# Patient Record
Sex: FEMALE | Race: WHITE
[De-identification: names, ages, dates, MRNs, and addresses within clinical notes are randomized per-mention and may not be internally consistent; named-entity substitution may affect disease eponyms.]

---

## 2018-11-18 ENCOUNTER — HOSPITAL ENCOUNTER (EMERGENCY)
Dept: HOSPITAL 92 - ERS | Age: 63
Discharge: NURSING, REHAB OR LONG-TERM CARE | End: 2018-11-18
Payer: COMMERCIAL

## 2018-11-18 DIAGNOSIS — G40.909: ICD-10-CM

## 2018-11-18 DIAGNOSIS — I10: ICD-10-CM
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
## 2018-11-18 DIAGNOSIS — Z79.899: ICD-10-CM

## 2018-11-18 DIAGNOSIS — F31.9: ICD-10-CM

## 2018-11-18 DIAGNOSIS — Z79.01: ICD-10-CM

## 2018-11-18 DIAGNOSIS — S01.01XA: Primary | ICD-10-CM

## 2018-11-18 PROCEDURE — 90471 IMMUNIZATION ADMIN: CPT

## 2018-11-18 PROCEDURE — 12001 RPR S/N/AX/GEN/TRNK 2.5CM/<: CPT

## 2018-11-18 PROCEDURE — 70450 CT HEAD/BRAIN W/O DYE: CPT

## 2018-11-18 PROCEDURE — 90715 TDAP VACCINE 7 YRS/> IM: CPT

## 2018-11-18 NOTE — CT
HEAD CT WITHOUT CONTRAST:

 

11/18/2018

 

HISTORY:

Fall.  Trauma.  Pain.

 

COMPARISON:

01/07/2017

 

TECHNIQUE:

Serial axial CT imaging obtained at 5 mm intervals, from the vertex through the skull base, without c
ontrast.

 

FINDINGS: 

The imaged paranasal sinuses/mastoid air cells are well aerated.

 

There is mild/moderate cerebral volume loss with associated prominence of the CSF containing spaces, 
a stable finding.  There is no intracranial hemorrhage, midline shift, or mass effect.  No displaced 
calvarial fracture.

 

IMPRESSION:

No intracranial hemorrhage or displaced calvarial fracture.

 

POS: [facility]

## 2019-11-15 ENCOUNTER — HOSPITAL ENCOUNTER (INPATIENT)
Dept: HOSPITAL 92 - ERS | Age: 64
LOS: 6 days | Discharge: NURSING, REHAB OR LONG-TERM CARE | DRG: 605 | End: 2019-11-21
Attending: INTERNAL MEDICINE | Admitting: INTERNAL MEDICINE
Payer: COMMERCIAL

## 2019-11-15 VITALS — BODY MASS INDEX: 22.3 KG/M2

## 2019-11-15 DIAGNOSIS — Z86.711: ICD-10-CM

## 2019-11-15 DIAGNOSIS — N39.0: ICD-10-CM

## 2019-11-15 DIAGNOSIS — Z79.899: ICD-10-CM

## 2019-11-15 DIAGNOSIS — Z86.718: ICD-10-CM

## 2019-11-15 DIAGNOSIS — F03.90: ICD-10-CM

## 2019-11-15 DIAGNOSIS — G40.909: ICD-10-CM
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
## 2019-11-15 DIAGNOSIS — Z88.5: ICD-10-CM

## 2019-11-15 DIAGNOSIS — Z66: ICD-10-CM

## 2019-11-15 DIAGNOSIS — F31.9: ICD-10-CM

## 2019-11-15 DIAGNOSIS — S40.021A: Primary | ICD-10-CM

## 2019-11-15 DIAGNOSIS — Z79.82: ICD-10-CM

## 2019-11-15 LAB
ALBUMIN SERPL BCG-MCNC: 3.3 G/DL (ref 3.4–4.8)
ALP SERPL-CCNC: 147 U/L (ref 40–110)
ALT SERPL W P-5'-P-CCNC: 15 U/L (ref 8–55)
ANION GAP SERPL CALC-SCNC: 11 MMOL/L (ref 10–20)
AST SERPL-CCNC: 21 U/L (ref 5–34)
BACTERIA UR QL AUTO: (no result) HPF
BASOPHILS # BLD AUTO: 0 THOU/UL (ref 0–0.2)
BASOPHILS NFR BLD AUTO: 0.3 % (ref 0–1)
BILIRUB SERPL-MCNC: 0.3 MG/DL (ref 0.2–1.2)
BUN SERPL-MCNC: 12 MG/DL (ref 9.8–20.1)
CALCIUM SERPL-MCNC: 8.6 MG/DL (ref 7.8–10.44)
CHLORIDE SERPL-SCNC: 106 MMOL/L (ref 98–107)
CO2 SERPL-SCNC: 25 MMOL/L (ref 23–31)
CREAT CL PREDICTED SERPL C-G-VRATE: 0 ML/MIN (ref 70–130)
EOSINOPHIL # BLD AUTO: 0 THOU/UL (ref 0–0.7)
EOSINOPHIL NFR BLD AUTO: 0.1 % (ref 0–10)
GLOBULIN SER CALC-MCNC: 3.1 G/DL (ref 2.4–3.5)
GLUCOSE SERPL-MCNC: 97 MG/DL (ref 80–115)
HGB BLD-MCNC: 13.5 G/DL (ref 12–16)
LEUKOCYTE ESTERASE UR QL STRIP.AUTO: 25 LEU/UL
LYMPHOCYTES # BLD: 1.8 THOU/UL (ref 1.2–3.4)
LYMPHOCYTES NFR BLD AUTO: 17.4 % (ref 21–51)
MCH RBC QN AUTO: 35.3 PG (ref 27–31)
MCV RBC AUTO: 103 FL (ref 78–98)
MONOCYTES # BLD AUTO: 1 THOU/UL (ref 0.11–0.59)
MONOCYTES NFR BLD AUTO: 9.4 % (ref 0–10)
NEUTROPHILS # BLD AUTO: 7.6 THOU/UL (ref 1.4–6.5)
NEUTROPHILS NFR BLD AUTO: 72.8 % (ref 42–75)
PLATELET # BLD AUTO: 236 THOU/UL (ref 130–400)
POTASSIUM SERPL-SCNC: 3.8 MMOL/L (ref 3.5–5.1)
PROT UR STRIP.AUTO-MCNC: 20 MG/DL
RBC # BLD AUTO: 3.84 MILL/UL (ref 4.2–5.4)
SODIUM SERPL-SCNC: 138 MMOL/L (ref 136–145)
WBC # BLD AUTO: 10.4 THOU/UL (ref 4.8–10.8)

## 2019-11-15 PROCEDURE — 81015 MICROSCOPIC EXAM OF URINE: CPT

## 2019-11-15 PROCEDURE — 80177 DRUG SCRN QUAN LEVETIRACETAM: CPT

## 2019-11-15 PROCEDURE — 83735 ASSAY OF MAGNESIUM: CPT

## 2019-11-15 PROCEDURE — 87149 DNA/RNA DIRECT PROBE: CPT

## 2019-11-15 PROCEDURE — 36415 COLL VENOUS BLD VENIPUNCTURE: CPT

## 2019-11-15 PROCEDURE — 87449 NOS EACH ORGANISM AG IA: CPT

## 2019-11-15 PROCEDURE — 87804 INFLUENZA ASSAY W/OPTIC: CPT

## 2019-11-15 PROCEDURE — 87040 BLOOD CULTURE FOR BACTERIA: CPT

## 2019-11-15 PROCEDURE — 80053 COMPREHEN METABOLIC PANEL: CPT

## 2019-11-15 PROCEDURE — 80048 BASIC METABOLIC PNL TOTAL CA: CPT

## 2019-11-15 PROCEDURE — 87086 URINE CULTURE/COLONY COUNT: CPT

## 2019-11-15 PROCEDURE — 36416 COLLJ CAPILLARY BLOOD SPEC: CPT

## 2019-11-15 PROCEDURE — 51701 INSERT BLADDER CATHETER: CPT

## 2019-11-15 PROCEDURE — 96365 THER/PROPH/DIAG IV INF INIT: CPT

## 2019-11-15 PROCEDURE — 96360 HYDRATION IV INFUSION INIT: CPT

## 2019-11-15 PROCEDURE — 80202 ASSAY OF VANCOMYCIN: CPT

## 2019-11-15 PROCEDURE — 87427 SHIGA-LIKE TOXIN AG IA: CPT

## 2019-11-15 PROCEDURE — 71045 X-RAY EXAM CHEST 1 VIEW: CPT

## 2019-11-15 PROCEDURE — 87324 CLOSTRIDIUM AG IA: CPT

## 2019-11-15 PROCEDURE — 81003 URINALYSIS AUTO W/O SCOPE: CPT

## 2019-11-15 PROCEDURE — 96366 THER/PROPH/DIAG IV INF ADDON: CPT

## 2019-11-15 PROCEDURE — 83605 ASSAY OF LACTIC ACID: CPT

## 2019-11-15 PROCEDURE — 80185 ASSAY OF PHENYTOIN TOTAL: CPT

## 2019-11-15 PROCEDURE — 87045 FECES CULTURE AEROBIC BACT: CPT

## 2019-11-15 PROCEDURE — 96367 TX/PROPH/DG ADDL SEQ IV INF: CPT

## 2019-11-15 PROCEDURE — 87046 STOOL CULTR AEROBIC BACT EA: CPT

## 2019-11-15 PROCEDURE — A4353 INTERMITTENT URINARY CATH: HCPCS

## 2019-11-15 PROCEDURE — 85025 COMPLETE CBC W/AUTO DIFF WBC: CPT

## 2019-11-15 NOTE — ULT
RIGHT UPPER EXTREMITY VENOUS ULTRASOUND:



CLINICAL HISTORY:

Swelling.



FINDINGS: 

There is prominent soft tissue heterogeneity and edema of the right upper extremity which may be on t
he basis of prominent sized hematoma.



No evidence of deep vein thrombosis is seen, although evaluation is limited due to extensive edema an
d soft tissue hematoma.



IMPRESSION: 

1.  Prominent, heterogeneous region of the right upper extremity soft tissues, suggestive of hematoma
 with surrounding edema. Correlate clinically.



2.  No evidence of deep venous thrombosis involving right upper extremity, within limitations.



Transcribed Date/Time: 11/15/2019 1:28 PM



Reported By: Art Driver 

Electronically Signed:  11/15/2019 3:59 PM

## 2019-11-15 NOTE — RAD
SINGLE VIEW OF THE CHEST:

 

COMPARISON: 

None.

 

HISTORY: 

Fall and altered mental status.

 

FINDINGS:

Single view of the chest shows a normal sized cardiomediastinal silhouette. There is no evidence of c
onsolidation, mass, or pleural effusion. The bones are unremarkable.

 

IMPRESSION:

No evidence of acute cardiopulmonary disease.

 

POS: [facility]

## 2019-11-16 RX ADMIN — VANCOMYCIN HYDROCHLORIDE SCH MLS: 1 INJECTION, SOLUTION INTRAVENOUS at 10:27

## 2019-11-16 RX ADMIN — VANCOMYCIN HYDROCHLORIDE SCH MLS: 1 INJECTION, SOLUTION INTRAVENOUS at 20:29

## 2019-11-16 RX ADMIN — ASPIRIN 81 MG CHEWABLE TABLET SCH MG: 81 TABLET CHEWABLE at 10:30

## 2019-11-16 RX ADMIN — LEVETIRACETAM SCH MG: 100 SOLUTION ORAL at 10:30

## 2019-11-16 RX ADMIN — LEVETIRACETAM SCH MG: 100 SOLUTION ORAL at 20:24

## 2019-11-16 NOTE — HP
CHIEF COMPLAINT:  Right upper arm swelling.



HISTORY OF PRESENT ILLNESS:  Ms. Ray is a 64-year-old  female with 
past

medical history of multi-infarct dementia, seizure disorder, who was noted to 
have

right upper arm swelling with ecchymosis.  The patient had a fall about 10 days 
ago.

 There was no swelling at that time, then swelling was noticed day before 
yesterday,

which was quite marked in the right upper arm with a lot of ecchymosis along the

forearm and upper arm, and it was warm to touch and tender.  In view of that, 
the

patient was transferred to the hospital.  In the ER, the patient was evaluated 
and

found to have a hematoma in the right upper arm with possible secondary 
infection,

where the patient had fever.  The ultrasound revealed large hematoma along with 
some

surrounding edema.  The patient was given Rocephin and vancomycin in the ER and 
was

started on IV fluids as well. 



PAST MEDICAL HISTORY:  

1. Multi-infarct dementia.

2. Seizure disorder.

3. History of bipolar disorder.

4. History of sepsis.

5. History of pulmonary embolism and DVT status post IVC filter in 2017.



CURRENT MEDICATIONS:  The patient is on:

1. Tylenol p.r.n.

2. Aspirin 81 mg daily.

3. Lipitor 20 mg daily.

4. Bisacodyl 10 mg p.r.n. daily.

5. BuSpar 20 mg b.i.d.

6. Vitamin D 2000 units daily.

7. Keppra 500 b.i.d.

8. Lisinopril 10 mg daily.

9. Milk of magnesia p.r.n.

10. Remeron 15 mg at bedtime.

11. Dilantin 50 t.i.d.

12. MiraLAX 17 g daily.



ALLERGIES:  CODEINE.



FAMILY HISTORY:  Nothing contributory.



SOCIAL HISTORY:  The patient lives in nursing home.  No history of smoking.  No

history of alcohol. 



REVIEW OF SYSTEMS:  Unable to obtain as patient is awake but not communicating 
and

nonverbal. 



PHYSICAL EXAMINATION:

VITAL SIGNS:  Temperature 100.7, pulse 104, respirations 20, blood pressure 120/
70. 

HEENT:  Head is normocephalic and atraumatic.  Pupils are equal and reactive.

Nasopharynx is pale and dry.  Hard and soft palate; no lesions.  Skin turgor

decreased. 

NECK:  Supple.  No JVD. 

LUNGS:  Bilateral air entry present.  No rales.  No rhonchi. 

HEART:  S1 and S2, regular. 

ABDOMEN:  Soft.  No distention.  No tenderness.  Normal bowel sounds present. 

RECTAL:  Deferred. 

CENTRAL NERVOUS SYSTEM:  No focal deficit. 

EXTREMITIES:  Right upper arm is markedly swollen.  There is ecchymosis and 
also it

is warm to touch, it is tender.  The ecchymosis extends to the right forearm as

well.  Elbow movements are restricted a little bit.  There is some pitting 
edema of

the right elbow area. 



LABORATORY DATA:  CBC shows WBC 10, hemoglobin 13, hematocrit 39, and platelets 
236.

 Metabolic pane:  Sodium 138, potassium  3.7, chloride 100, 

creatinine 0.6, glucose 147.  UA shows wbc's 11-20, bacteria 1+.  Vascular

ultrasound showed hematoma right upper arm with surrounding edema.  Chest x-ray

negative. 



ASSESSMENT:  

1. Status post fall with hematoma, right upper arm, possibly infected.

2. Urinary tract infection.

3. Dementia, multiinfarct.

4. Bipolar disorder.

5. Seizure disorder.

6. Status post IVC filter for deep vein thrombosis and pulmonary embolism.



PLAN:  

1. Vital signs q.4 hours.

2. Activity as tolerated.

3. Allergies, codeine.

4. IV fluids D5 half at 70 mL/h.

5. Continue nursing home medications.

6. Vancomycin 1 g IV piggyback q.12 hours.

7. Rocephin 2 g IV piggyback daily.

8. We will repeat labs in the morning, Dilantin level in the morning.







Job ID:  [id number]



[facility]

## 2019-11-17 LAB
ANION GAP SERPL CALC-SCNC: 8 MMOL/L (ref 10–20)
BASOPHILS # BLD AUTO: 0.1 THOU/UL (ref 0–0.2)
BASOPHILS NFR BLD AUTO: 0.9 % (ref 0–1)
BUN SERPL-MCNC: 6 MG/DL (ref 9.8–20.1)
CALCIUM SERPL-MCNC: 7.8 MG/DL (ref 7.8–10.44)
CHLORIDE SERPL-SCNC: 106 MMOL/L (ref 98–107)
CO2 SERPL-SCNC: 21 MMOL/L (ref 23–31)
CREAT CL PREDICTED SERPL C-G-VRATE: 79 ML/MIN (ref 70–130)
EOSINOPHIL # BLD AUTO: 0 THOU/UL (ref 0–0.7)
EOSINOPHIL NFR BLD AUTO: 0.1 % (ref 0–10)
GLUCOSE SERPL-MCNC: 138 MG/DL (ref 80–115)
HGB BLD-MCNC: 11.8 G/DL (ref 12–16)
LYMPHOCYTES # BLD: 0.3 THOU/UL (ref 1.2–3.4)
LYMPHOCYTES NFR BLD AUTO: 4 % (ref 21–51)
MCH RBC QN AUTO: 34.8 PG (ref 27–31)
MCV RBC AUTO: 101 FL (ref 78–98)
MONOCYTES # BLD AUTO: 0.4 THOU/UL (ref 0.11–0.59)
MONOCYTES NFR BLD AUTO: 4.7 % (ref 0–10)
NEUTROPHILS # BLD AUTO: 7.5 THOU/UL (ref 1.4–6.5)
NEUTROPHILS NFR BLD AUTO: 90.4 % (ref 42–75)
PLATELET # BLD AUTO: 162 THOU/UL (ref 130–400)
POTASSIUM SERPL-SCNC: 3 MMOL/L (ref 3.5–5.1)
RBC # BLD AUTO: 3.37 MILL/UL (ref 4.2–5.4)
SODIUM SERPL-SCNC: 132 MMOL/L (ref 136–145)
VANCOMYCIN TROUGH SERPL-MCNC: 11.2 UG/ML
WBC # BLD AUTO: 8.3 THOU/UL (ref 4.8–10.8)

## 2019-11-17 RX ADMIN — VANCOMYCIN HYDROCHLORIDE SCH MLS: 750 INJECTION, POWDER, LYOPHILIZED, FOR SOLUTION INTRAVENOUS at 22:00

## 2019-11-17 RX ADMIN — LEVETIRACETAM SCH MG: 100 SOLUTION ORAL at 21:30

## 2019-11-17 RX ADMIN — CEFTRIAXONE SCH MLS: 2 INJECTION, POWDER, FOR SOLUTION INTRAMUSCULAR; INTRAVENOUS at 09:05

## 2019-11-17 RX ADMIN — LEVETIRACETAM SCH MG: 100 SOLUTION ORAL at 09:10

## 2019-11-17 RX ADMIN — VANCOMYCIN HYDROCHLORIDE SCH: 1 INJECTION, SOLUTION INTRAVENOUS at 22:13

## 2019-11-17 RX ADMIN — VANCOMYCIN HYDROCHLORIDE SCH MLS: 1 INJECTION, SOLUTION INTRAVENOUS at 09:33

## 2019-11-17 RX ADMIN — ASPIRIN 81 MG CHEWABLE TABLET SCH MG: 81 TABLET CHEWABLE at 09:20

## 2019-11-18 LAB — VANCOMYCIN TROUGH SERPL-MCNC: 16.9 UG/ML

## 2019-11-18 RX ADMIN — VANCOMYCIN HYDROCHLORIDE SCH MLS: 750 INJECTION, POWDER, LYOPHILIZED, FOR SOLUTION INTRAVENOUS at 05:10

## 2019-11-18 RX ADMIN — VANCOMYCIN HYDROCHLORIDE SCH MLS: 750 INJECTION, POWDER, LYOPHILIZED, FOR SOLUTION INTRAVENOUS at 21:55

## 2019-11-18 RX ADMIN — LEVETIRACETAM SCH MG: 100 SOLUTION ORAL at 08:49

## 2019-11-18 RX ADMIN — LEVETIRACETAM SCH MG: 100 SOLUTION ORAL at 21:55

## 2019-11-18 RX ADMIN — CEFTRIAXONE SCH MLS: 2 INJECTION, POWDER, FOR SOLUTION INTRAMUSCULAR; INTRAVENOUS at 08:46

## 2019-11-18 RX ADMIN — VANCOMYCIN HYDROCHLORIDE SCH MLS: 750 INJECTION, POWDER, LYOPHILIZED, FOR SOLUTION INTRAVENOUS at 14:47

## 2019-11-18 RX ADMIN — ASPIRIN 81 MG CHEWABLE TABLET SCH MG: 81 TABLET CHEWABLE at 08:49

## 2019-11-18 NOTE — CON
DATE OF CONSULTATION:  11/18/2019



REASON FOR CONSULTATION:  Fever.



HISTORY OF PRESENT ILLNESS:  A 64-year-old, who has a history of dementia, probably

multi-infarct, who is a nursing home resident, and I had seen in February 2017.  At

that time, she had a prior history of parotitis with MSSA bacteremia and a right

lower extremity DVT managed with IVC filter.  This time, she presents with a

laceration of scalp after a fall and right upper extremity swelling with bruising.

Ultrasound was done, which showed tissue heterogeneity in the edema of right upper

extremity without evidence of deep vein thrombosis.  I do not see any imaging

studies of the extremity here.  On admission, she was given Rocephin and vancomycin.

 Currently, Ms. Ray does not open her eyes.  The only thing she responds to is food

offerings, that we will elicit chewing on her part and swallowing.  No evidence of

aspiration.  No diarrhea.  She is voiding in the diaper. 



PAST MEDICAL HISTORY:  Multi-infarct dementia; seizure disorder; bipolar disorder;

sepsis; prior MSSA bacteremia; and pulmonary embolism in the past, status post IVC

filter in 2017; seizure activity. 



ALLERGIES:  CODEINE.



FAMILY HISTORY:  Noncontributory.



SOCIAL HISTORY:  Never smoker.  Nursing home resident.



CURRENT MEDICATIONS:  

1. Tylenol.

2. Aspirin.

3. Lipitor.

4. Dulcolax.

5. BuSpar.

6. Ceftriaxone.

7. Keppra.

8. Remeron.

9. Dilantin.

10. MiraLAX.

11. Vancomycin.



PHYSICAL EXAMINATION:

VITAL SIGNS:  T-max 101.7 yesterday at 8 a.m.  BP is now 76/46, I am not sure if

this is accurate.  Pulse 88, respirations 16, O2 saturation 95%. 

SKIN:  Shows the area of swelling in the right arm with bruising extending from the

axilla towards the elbow.  No erythema noted.  The patient has a peripheral IV

access. 

LYMPHATICS:  No lymphadenopathy. 

HEENT:  Ocular movements are conjugate.  Oral cavity, not possible to evaluate

because the patient did not cooperate. 

NECK:  Supple. 

LUNGS:  With symmetric, clear breath sounds. 

HEART:  S1 and S2.  Regular rate.  No jugular vein distention. 

ABDOMEN:  A little bit taut.  Bladder distention is possible.  No organomegaly or

ascites.  Bowel sounds are present, but not increased. 

EXTREMITIES:  She does not move extremities.  She has some increase in tonicity of

the muscles. 



LABORATORY DATA:  White cell count is 10.4, hemoglobin 8.3, platelets 236, 72%

neutrophils and now up to 90%.  Sodium 138, creatinine 0.67.  Liver profile with

alkaline phosphatase 147, transaminases normal, albumin 3.3.  Urinalysis with 11 to

20 wbc's, and phenytoin was 2.6. 



ASSESSMENT:  Multi-infarct dementia, seizure activity, parotitis with

methicillin-sensitive Staphylococcus aureus bacteremia in the past, deep vein

thrombosis with inferior vena cava filter, fever recurrence, and fall with bruising

of the right upper extremity. 



DISCUSSION:  Differential diagnosis includes fever secondary to hematoma in the

right upper extremity versus an alternate process.  The other main possibility would

be urinary retention.  We will check bladder scan to measure postvoid residual.

Pneumonia is not likely at this point in time, but may have to order a CT of chest.

It appears the patient has a do not resuscitate order.  I think that palliative care

would be 

a reasonable approach without any further evaluation.  Hematomas following fractures

can be associated with fever.  I do not see any imaging study to evaluate the

humerus.  May want to order an x-ray of the right humerus to make sure she did not

have a fracture. 







Job ID:  [id number]

## 2019-11-18 NOTE — RAD
Exam:2 views right humerus



HISTORY: Fall. Pain. Bruising



COMPARISON: None



FINDINGS: No fracture. No cortical irregularity. No periosteal reaction



IMPRESSION: No fracture.



Reported By: Michell Cool 

Electronically Signed:  11/18/2019 3:11 PM

## 2019-11-19 LAB
ANION GAP SERPL CALC-SCNC: 9 MMOL/L (ref 10–20)
BASOPHILS # BLD AUTO: 0 THOU/UL (ref 0–0.2)
BASOPHILS NFR BLD AUTO: 0.1 % (ref 0–1)
BUN SERPL-MCNC: 5 MG/DL (ref 9.8–20.1)
CALCIUM SERPL-MCNC: 7.8 MG/DL (ref 7.8–10.44)
CHLORIDE SERPL-SCNC: 108 MMOL/L (ref 98–107)
CO2 SERPL-SCNC: 20 MMOL/L (ref 23–31)
CREAT CL PREDICTED SERPL C-G-VRATE: 82 ML/MIN (ref 70–130)
EOSINOPHIL # BLD AUTO: 0 THOU/UL (ref 0–0.7)
EOSINOPHIL NFR BLD AUTO: 0.2 % (ref 0–10)
GLUCOSE SERPL-MCNC: 109 MG/DL (ref 80–115)
HGB BLD-MCNC: 10.3 G/DL (ref 12–16)
LYMPHOCYTES # BLD: 0.4 THOU/UL (ref 1.2–3.4)
LYMPHOCYTES NFR BLD AUTO: 10.8 % (ref 21–51)
MCH RBC QN AUTO: 34.4 PG (ref 27–31)
MCV RBC AUTO: 99.2 FL (ref 78–98)
MONOCYTES # BLD AUTO: 0.4 THOU/UL (ref 0.11–0.59)
MONOCYTES NFR BLD AUTO: 11.6 % (ref 0–10)
NEUTROPHILS # BLD AUTO: 2.7 THOU/UL (ref 1.4–6.5)
NEUTROPHILS NFR BLD AUTO: 77.2 % (ref 42–75)
PLATELET # BLD AUTO: 145 THOU/UL (ref 130–400)
POTASSIUM SERPL-SCNC: 2.9 MMOL/L (ref 3.5–5.1)
POTASSIUM SERPL-SCNC: 3.6 MMOL/L (ref 3.5–5.1)
RBC # BLD AUTO: 2.99 MILL/UL (ref 4.2–5.4)
SODIUM SERPL-SCNC: 134 MMOL/L (ref 136–145)
WBC # BLD AUTO: 3.6 THOU/UL (ref 4.8–10.8)

## 2019-11-19 RX ADMIN — ASPIRIN 81 MG CHEWABLE TABLET SCH MG: 81 TABLET CHEWABLE at 08:37

## 2019-11-19 RX ADMIN — VANCOMYCIN HYDROCHLORIDE SCH MLS: 750 INJECTION, POWDER, LYOPHILIZED, FOR SOLUTION INTRAVENOUS at 21:34

## 2019-11-19 RX ADMIN — CEFTRIAXONE SCH MLS: 2 INJECTION, POWDER, FOR SOLUTION INTRAMUSCULAR; INTRAVENOUS at 08:34

## 2019-11-19 RX ADMIN — LEVETIRACETAM SCH MG: 100 SOLUTION ORAL at 20:14

## 2019-11-19 RX ADMIN — VANCOMYCIN HYDROCHLORIDE SCH MLS: 750 INJECTION, POWDER, LYOPHILIZED, FOR SOLUTION INTRAVENOUS at 16:19

## 2019-11-19 RX ADMIN — LEVETIRACETAM SCH MG: 100 SOLUTION ORAL at 08:38

## 2019-11-19 RX ADMIN — VANCOMYCIN HYDROCHLORIDE SCH MLS: 750 INJECTION, POWDER, LYOPHILIZED, FOR SOLUTION INTRAVENOUS at 05:10

## 2019-11-20 LAB
ANION GAP SERPL CALC-SCNC: 12 MMOL/L (ref 10–20)
BUN SERPL-MCNC: 5 MG/DL (ref 9.8–20.1)
CALCIUM SERPL-MCNC: 8 MG/DL (ref 7.8–10.44)
CHLORIDE SERPL-SCNC: 108 MMOL/L (ref 98–107)
CO2 SERPL-SCNC: 19 MMOL/L (ref 23–31)
CREAT CL PREDICTED SERPL C-G-VRATE: 75 ML/MIN (ref 70–130)
GLUCOSE SERPL-MCNC: 119 MG/DL (ref 80–115)
POTASSIUM SERPL-SCNC: 3.3 MMOL/L (ref 3.5–5.1)
SODIUM SERPL-SCNC: 136 MMOL/L (ref 136–145)

## 2019-11-20 RX ADMIN — ASPIRIN 81 MG CHEWABLE TABLET SCH MG: 81 TABLET CHEWABLE at 08:35

## 2019-11-20 RX ADMIN — CEFTRIAXONE SCH MLS: 2 INJECTION, POWDER, FOR SOLUTION INTRAMUSCULAR; INTRAVENOUS at 08:31

## 2019-11-20 RX ADMIN — VANCOMYCIN HYDROCHLORIDE SCH MLS: 750 INJECTION, POWDER, LYOPHILIZED, FOR SOLUTION INTRAVENOUS at 05:31

## 2019-11-20 RX ADMIN — LEVETIRACETAM SCH MG: 100 SOLUTION ORAL at 08:35

## 2019-11-20 RX ADMIN — LEVETIRACETAM SCH MG: 100 SOLUTION ORAL at 21:57

## 2019-11-21 VITALS — TEMPERATURE: 97.7 F | DIASTOLIC BLOOD PRESSURE: 78 MMHG | SYSTOLIC BLOOD PRESSURE: 156 MMHG

## 2019-11-21 LAB
ANION GAP SERPL CALC-SCNC: 10 MMOL/L (ref 10–20)
BUN SERPL-MCNC: 4 MG/DL (ref 9.8–20.1)
CALCIUM SERPL-MCNC: 8.1 MG/DL (ref 7.8–10.44)
CHLORIDE SERPL-SCNC: 109 MMOL/L (ref 98–107)
CO2 SERPL-SCNC: 21 MMOL/L (ref 23–31)
CREAT CL PREDICTED SERPL C-G-VRATE: 75 ML/MIN (ref 70–130)
GLUCOSE SERPL-MCNC: 126 MG/DL (ref 80–115)
POTASSIUM SERPL-SCNC: 3.7 MMOL/L (ref 3.5–5.1)
SODIUM SERPL-SCNC: 136 MMOL/L (ref 136–145)

## 2019-11-21 RX ADMIN — LEVETIRACETAM SCH MG: 100 SOLUTION ORAL at 08:33

## 2019-11-21 RX ADMIN — ASPIRIN 81 MG CHEWABLE TABLET SCH MG: 81 TABLET CHEWABLE at 08:33

## 2019-11-29 ENCOUNTER — HOSPITAL ENCOUNTER (INPATIENT)
Dept: HOSPITAL 92 - ERS | Age: 64
LOS: 3 days | Discharge: NURSING, REHAB OR LONG-TERM CARE | DRG: 300 | End: 2019-12-02
Attending: INTERNAL MEDICINE | Admitting: INTERNAL MEDICINE
Payer: COMMERCIAL

## 2019-11-29 DIAGNOSIS — F01.50: ICD-10-CM

## 2019-11-29 DIAGNOSIS — F41.1: ICD-10-CM

## 2019-11-29 DIAGNOSIS — G40.909: ICD-10-CM

## 2019-11-29 DIAGNOSIS — M62.82: ICD-10-CM

## 2019-11-29 DIAGNOSIS — F01.51: ICD-10-CM

## 2019-11-29 DIAGNOSIS — Z86.711: ICD-10-CM

## 2019-11-29 DIAGNOSIS — Z74.01: ICD-10-CM

## 2019-11-29 DIAGNOSIS — F31.9: ICD-10-CM

## 2019-11-29 DIAGNOSIS — I10: ICD-10-CM

## 2019-11-29 DIAGNOSIS — I82.612: Primary | ICD-10-CM

## 2019-11-29 LAB
ALBUMIN SERPL BCG-MCNC: 3.6 G/DL (ref 3.4–4.8)
ALP SERPL-CCNC: 198 U/L (ref 40–110)
ALT SERPL W P-5'-P-CCNC: 25 U/L (ref 8–55)
ANION GAP SERPL CALC-SCNC: 11 MMOL/L (ref 10–20)
APTT PPP: 25.6 SEC (ref 22.9–36.1)
AST SERPL-CCNC: 32 U/L (ref 5–34)
BASOPHILS # BLD AUTO: 0 THOU/UL (ref 0–0.2)
BASOPHILS NFR BLD AUTO: 0.3 % (ref 0–1)
BILIRUB SERPL-MCNC: 0.3 MG/DL (ref 0.2–1.2)
BUN SERPL-MCNC: 8 MG/DL (ref 9.8–20.1)
CALCIUM SERPL-MCNC: 8.8 MG/DL (ref 7.8–10.44)
CHLORIDE SERPL-SCNC: 107 MMOL/L (ref 98–107)
CK SERPL-CCNC: 1380 U/L (ref 29–168)
CO2 SERPL-SCNC: 26 MMOL/L (ref 23–31)
CREAT CL PREDICTED SERPL C-G-VRATE: 0 ML/MIN (ref 70–130)
CRP SERPL-MCNC: 1.14 MG/DL
EOSINOPHIL # BLD AUTO: 0 THOU/UL (ref 0–0.7)
EOSINOPHIL NFR BLD AUTO: 0.3 % (ref 0–10)
GLOBULIN SER CALC-MCNC: 3.2 G/DL (ref 2.4–3.5)
GLUCOSE SERPL-MCNC: 95 MG/DL (ref 80–115)
HGB BLD-MCNC: 13.5 G/DL (ref 12–16)
INR PPP: 1
LIPASE SERPL-CCNC: 28 U/L (ref 8–78)
LYMPHOCYTES # BLD: 1.4 THOU/UL (ref 1.2–3.4)
LYMPHOCYTES NFR BLD AUTO: 17.1 % (ref 21–51)
MAGNESIUM SERPL-MCNC: 2.2 MG/DL (ref 1.6–2.6)
MCH RBC QN AUTO: 34.9 PG (ref 27–31)
MCV RBC AUTO: 103 FL (ref 78–98)
MONOCYTES # BLD AUTO: 0.8 THOU/UL (ref 0.11–0.59)
MONOCYTES NFR BLD AUTO: 10.4 % (ref 0–10)
NEUTROPHILS # BLD AUTO: 5.8 THOU/UL (ref 1.4–6.5)
NEUTROPHILS NFR BLD AUTO: 72 % (ref 42–75)
PLATELET # BLD AUTO: 343 THOU/UL (ref 130–400)
POTASSIUM SERPL-SCNC: 4.2 MMOL/L (ref 3.5–5.1)
PROTHROMBIN TIME: 13 SEC (ref 12–14.7)
RBC # BLD AUTO: 3.87 MILL/UL (ref 4.2–5.4)
RBC UR QL AUTO: (no result) HPF (ref 0–3)
SODIUM SERPL-SCNC: 140 MMOL/L (ref 136–145)
WBC # BLD AUTO: 8 THOU/UL (ref 4.8–10.8)
WBC UR QL AUTO: (no result) HPF (ref 0–3)

## 2019-11-29 PROCEDURE — 85610 PROTHROMBIN TIME: CPT

## 2019-11-29 PROCEDURE — 80053 COMPREHEN METABOLIC PANEL: CPT

## 2019-11-29 PROCEDURE — 85730 THROMBOPLASTIN TIME PARTIAL: CPT

## 2019-11-29 PROCEDURE — 36415 COLL VENOUS BLD VENIPUNCTURE: CPT

## 2019-11-29 PROCEDURE — 36416 COLLJ CAPILLARY BLOOD SPEC: CPT

## 2019-11-29 PROCEDURE — 96372 THER/PROPH/DIAG INJ SC/IM: CPT

## 2019-11-29 PROCEDURE — 87040 BLOOD CULTURE FOR BACTERIA: CPT

## 2019-11-29 PROCEDURE — A4353 INTERMITTENT URINARY CATH: HCPCS

## 2019-11-29 PROCEDURE — 80048 BASIC METABOLIC PNL TOTAL CA: CPT

## 2019-11-29 PROCEDURE — 81001 URINALYSIS AUTO W/SCOPE: CPT

## 2019-11-29 PROCEDURE — 83690 ASSAY OF LIPASE: CPT

## 2019-11-29 PROCEDURE — 86140 C-REACTIVE PROTEIN: CPT

## 2019-11-29 PROCEDURE — 82550 ASSAY OF CK (CPK): CPT

## 2019-11-29 PROCEDURE — 85025 COMPLETE CBC W/AUTO DIFF WBC: CPT

## 2019-11-29 PROCEDURE — 83605 ASSAY OF LACTIC ACID: CPT

## 2019-11-29 PROCEDURE — 83735 ASSAY OF MAGNESIUM: CPT

## 2019-11-29 NOTE — HP
PCP:  Manjinder Marin MD



CHIEF COMPLAINT:  Left forearm redness, swelling, warmth.



HISTORY OF PRESENT ILLNESS:  Ms. Ray is a 64-year-old female who is a resident of

[facility], who was found to have some left forearm swelling, erythema,

and warmth.  It was noted that it was not there yesterday.  The patient has been a

resident of the nursing home for some time.  History has been obtained from the EMS,

and [place] [facility] Staff who was contacted today after admission for some history.

 Staff reports that forearm was normal yesterday and when they went to check on her

this morning, they noticed it was red, warm, and a little swollen.  There is no

record of any fall, any injury.  She was admitted here 2 weeks ago, discharged on

the 21st with a possible septic hematoma on the right arm.  Nursing staff when

prompted states that it is quite possible she had a peripheral IV on her left arm

with a bandage when she returned back to [facility] on the 21st.  She was

taken off her Xarelto on the 21st after her hospitalization for hematoma and today,

she was found to have a CRP of 1.14, CK of 1380, and ultrasound of the left arm

shows a venous thrombosis involving the left basilic vein.  She was given a dose of

Lovenox 1 mg/kg and then admitted for rhabdo and DVT.  The patient is nonverbal.

Nursing home reports that she is non-mobile as well.  The patient will be admitted

to the medical floor for further management. 



REVIEW OF SYSTEMS:  The patient is nonverbal at baseline, and review of systems was

not able to be obtained. 



PHYSICAL EXAMINATION:

VITAL SIGNS:  Blood pressure is 138/84, pulse is 87, respiratory rate is 16,

temperature is 99, and O2 sats are 97% on room air.  Pain is unable to determine. 

CONSTITUTIONAL:  The patient appears nontoxic.  She will follow some with her eyes,

but is nonverbal at baseline. 

HEENT:  Head is atraumatic and normocephalic.  Eyes, pupils are normal to

inspection.  Pupils are equally round and reactive to light.  ENT, mouth exam is

normal.  Mucous membranes are moist. 

NECK:  Normal range of motion.  Trachea is midline. 

RESPIRATORY/CHEST:  Breath sounds are clear.  Chest expansion is equal. 

CARDIOVASCULAR:  Regular rate and rhythm.  Heart sounds are normal. 

ABDOMEN:  Nontender.  Bowel sounds are heard. 

BACK:  Normal inspection.  Has good .  Does not appear to be tender on

palpation. 

EXTREMITIES:  Upper extremity erythema, swelling, warmth to the left anterior distal

forearm with a 2-cm bulla that is empty.  No purulent drainage.  Radial pulses are

equal.  Lower extremity, normal inspection.  Pedal pulses are normal.  Skin is

visualized, is warm and dry, normal in color with the exception of the erythema and

warmth to the distal left forearm with the bulla formation.  There is some area of

erythema to the left shoulder. 



PAST MEDICAL HISTORY:  Multi-infarct dementia, seizure disorder, history of bipolar

disorder, history of sepsis, history of pulmonary emboli and DVT, status post IVC

filter, status post right arm hematoma thought to be possibly septic. 



CURRENT MEDICATIONS:  

1. Tylenol.

2. Aspirin 81 mg daily.

3. Lipitor 20 mg p.o. daily.

4. Bisacodyl 10 mg p.r.n. as needed.

5. BuSpar 20 mg b.i.d.

6. Vitamin D 2000 units daily.

7. Keppra 500 mg p.o. b.i.d.

8. Lisinopril 10 mg daily.

9. Milk of Mag p.r.n.

10. Remeron 50 mg at bedtime.

11. Dilantin 50 mg t.i.d.

12. MiraLAX 17 g daily.



ALLERGIES:  CODEINE.



FAMILY HISTORY:  Nothing contributory.



SOCIAL HISTORY:  The patient lives in a nursing home in [place].  There is no

documented history of smoking or history of alcohol.  Per the nursing home, the

patient has an adult hospital DNR, and per the nursing home, there is no family to

contact, and she has a guardian. 



ASSESSMENT:  

1. Left venous deep venous thrombosis, upper extremity.  Lovenox has been started 1

mL per kg.  We will continue this b.i.d., most likely changed to be p.o. in the next

day or 2. 

2. Dementia, multi-infarct.  We will continue home medications.

3. Bipolar disorder.  Continue home medications.

4. Seizure disorder.  Continue home medications.

5. Status post inferior vena cava filter for deep venous thrombosis and pulmonary

emboli. 

6. Rhabdomyolysis.  Fluid we have given in the ER.  We will continue normal saline

100 mL per hour.  We will recheck CK in the morning. 

7. GI prophylaxis started.  DVT prophylaxis with Lovenox. 

Hospital course dependent on clinical findings.  Case discussed with Dr. Navas

who agrees with plan. 







Job ID:  [id number]

## 2019-11-29 NOTE — RAD
LEFT FOREARM 2 VIEWS:

 

HISTORY: 

Pain and edema.

 

FINDINGS/IMPRESSION: 

Radius and ulna appear intact.  No osseous abnormality identified.

 

POS: [facility]

## 2019-11-29 NOTE — ULT
VENOUS DUPLEX STUDY LEFT UPPER EXTREMITY:

 

INDICATION: 

Left upper extremity pain and swelling.

 

FINDINGS: 

The left internal jugular vein is patent.  The left subclavian vein shows normal flow with Doppler.  
The left axillary vein, brachial vein, and cephalic vein are patent and show normal flow.

 

The left basilic vein is occluded from the antecubital fossa to the axilla.  

 

IMPRESSION: 

Venous thrombosis involving the left basilic vein.  

 

POS: [facility]

## 2019-11-30 LAB
ANION GAP SERPL CALC-SCNC: 9 MMOL/L (ref 10–20)
BASOPHILS # BLD AUTO: 0 THOU/UL (ref 0–0.2)
BASOPHILS NFR BLD AUTO: 0.4 % (ref 0–1)
BUN SERPL-MCNC: 9 MG/DL (ref 9.8–20.1)
CALCIUM SERPL-MCNC: 8.2 MG/DL (ref 7.8–10.44)
CHLORIDE SERPL-SCNC: 111 MMOL/L (ref 98–107)
CK SERPL-CCNC: 871 U/L (ref 29–168)
CO2 SERPL-SCNC: 24 MMOL/L (ref 23–31)
CREAT CL PREDICTED SERPL C-G-VRATE: 77 ML/MIN (ref 70–130)
EOSINOPHIL # BLD AUTO: 0 THOU/UL (ref 0–0.7)
EOSINOPHIL NFR BLD AUTO: 0.3 % (ref 0–10)
GLUCOSE SERPL-MCNC: 100 MG/DL (ref 80–115)
HGB BLD-MCNC: 12.1 G/DL (ref 12–16)
LYMPHOCYTES # BLD: 1.2 THOU/UL (ref 1.2–3.4)
LYMPHOCYTES NFR BLD AUTO: 22.3 % (ref 21–51)
MCH RBC QN AUTO: 35.1 PG (ref 27–31)
MCV RBC AUTO: 103 FL (ref 78–98)
MONOCYTES # BLD AUTO: 0.5 THOU/UL (ref 0.11–0.59)
MONOCYTES NFR BLD AUTO: 9.6 % (ref 0–10)
NEUTROPHILS # BLD AUTO: 3.7 THOU/UL (ref 1.4–6.5)
NEUTROPHILS NFR BLD AUTO: 67.5 % (ref 42–75)
PLATELET # BLD AUTO: 296 THOU/UL (ref 130–400)
POTASSIUM SERPL-SCNC: 4.1 MMOL/L (ref 3.5–5.1)
RBC # BLD AUTO: 3.44 MILL/UL (ref 4.2–5.4)
SODIUM SERPL-SCNC: 140 MMOL/L (ref 136–145)
WBC # BLD AUTO: 5.4 THOU/UL (ref 4.8–10.8)

## 2019-11-30 NOTE — PRG
DATE OF SERVICE:  11/30/2019



SUBJECTIVE:  The patient is seen and examined at the bedside.  She is nonverbal.

She is in deep sleep.  During my visit, she does not want to wake up and she does

not follow my commands.  She is very uncooperative. 



OBJECTIVE:  LUNGS:  Her lung sounds clear. 

HEART:  S1, S2 normal. 

ABDOMEN: soft, nondistended. 

EXTREMITIES:  No clubbing or cyanosis.  There is some edema of the left and some of

the right upper extremity.  The dressing is in place over the left forearm.

Urologically not possible to examine her.  She does not follow.  She does not want

to wake up and open her eyes. 



LABORATORY DATA:  White count of 5.4, hemoglobin of 12.1, hematocrit 35.5, platelet

count is 296,000.  Sodium 140, potassium 4.1, chloride 111, CO2 of 24, BUN 9,

creatinine 0.65, glucose 100, calcium 8.2, and creatine kinase is down to 871. 



IMPRESSION:  

1. Left upper extremity venous thrombosis on Lovenox 1 mg/kg q.12 hours

subcutaneously.  We will continue that treatment. 

2. Dementia, multi-infarct.

3. Bipolar disorder.

4. Seizure disorder.

5. Status post inferior vena cava filter for DVT thrombosis and pulmonary emboli.

6. Rhabdomyolysis with significant improvement on her CPK down from 1300s to 800s.

We will continue her rate of normal saline at 100 mL/h. 







Job ID:  [id number]

## 2019-12-01 LAB
ANION GAP SERPL CALC-SCNC: 12 MMOL/L (ref 10–20)
BUN SERPL-MCNC: 5 MG/DL (ref 9.8–20.1)
CALCIUM SERPL-MCNC: 8.3 MG/DL (ref 7.8–10.44)
CHLORIDE SERPL-SCNC: 111 MMOL/L (ref 98–107)
CK SERPL-CCNC: 802 U/L (ref 29–168)
CO2 SERPL-SCNC: 20 MMOL/L (ref 23–31)
CREAT CL PREDICTED SERPL C-G-VRATE: 87 ML/MIN (ref 70–130)
GLUCOSE SERPL-MCNC: 94 MG/DL (ref 80–115)
POTASSIUM SERPL-SCNC: 3.9 MMOL/L (ref 3.5–5.1)
SODIUM SERPL-SCNC: 139 MMOL/L (ref 136–145)

## 2019-12-01 RX ADMIN — LEVETIRACETAM SCH MG: 100 SOLUTION ORAL at 20:30

## 2019-12-01 RX ADMIN — ASPIRIN 81 MG CHEWABLE TABLET SCH MG: 81 TABLET CHEWABLE at 07:53

## 2019-12-01 RX ADMIN — LEVETIRACETAM SCH MG: 100 SOLUTION ORAL at 07:51

## 2019-12-01 NOTE — PRG
DATE OF SERVICE:  12/01/2019



SUBJECTIVE:  The patient is seen and examined at bedside.  She is not very

responsive.  There is no any contact with this lady. 



PHYSICAL EXAMINATION:

Very limited because she does not follow. 

VITAL SIGNS:  Her temperature is 99.9, pulse is 82, respiratory rate is 16, O2

saturation is 95% to 97% on room air, her blood pressure is 131/83. 

LUNGS:  Clear. 

HEART:  S1 and S2, normal. 

ABDOMEN:  Soft and nondistended. 

EXTREMITIES:  No cyanosis or edema. 

NEUROLOGIC:  As mentioned.  There is no any contact with this lady.  She does not

talk.  She does not follow my commands. 



LABORATORY DATA:  Showed sodium of 139, potassium 3.9, chloride 111, CO2 of 20, BUN

5, creatinine 0.58.  CPK is 802.  Calcium 8.3. 



IMPRESSION:  

1. Left upper extremity venous thrombosis, basilic vein involved.  She is switched

from Lovenox to apixaban 5 mg twice a day. 

2. Rhabdomyolysis.  Her CPK still 800.  We will continue IV fluids.

3. Dementia, multi-infarct.

4. Bipolar disorder.

5. Seizure disorder, chronic, stable.

6. Status post inferior vena cava filter for deep venous thrombosis and pulmonary

emboli. 



PLAN:  To continue her IV fluids at 90 mL/h.  Check CPK in the morning.  Continue

apixaban, discontinue Lovenox and continue other regimen.  She should be able to go

back to the nursing home in the next 24 hours. 







Job ID:  [id number]

## 2019-12-01 NOTE — DIS
DATE OF ADMISSION:  11/29/2019



DATE OF DISCHARGE:  12/01/2019



DIAGNOSES AT THE TIME OF DISCHARGE:  

1. Left upper extremity venous thrombosis involving left basilic vein.

2. Dementia, multi-infarct.

3. Bipolar disorder.

4. Seizure disorder.

5. Status post inferior vena cava filter for deep venous thrombosis and thrombosis

and pulmonary emboli. 

6. Rhabdomyolysis with significant improvement on her CPK down from 1300 to 800. 

__________







Job ID:  [id number]

## 2019-12-02 VITALS — DIASTOLIC BLOOD PRESSURE: 78 MMHG | SYSTOLIC BLOOD PRESSURE: 160 MMHG | TEMPERATURE: 97.7 F

## 2019-12-02 RX ADMIN — ASPIRIN 81 MG CHEWABLE TABLET SCH MG: 81 TABLET CHEWABLE at 08:27

## 2019-12-02 RX ADMIN — LEVETIRACETAM SCH MG: 100 SOLUTION ORAL at 08:27

## 2019-12-03 NOTE — DIS
DATE OF ADMISSION:  11/29/2019



DATE OF DISCHARGE:  12/02/2019



DISCHARGE DIAGNOSES:  

1. Left upper extremity venous thrombosis of the left basilic vein.

2. Multi-infarct dementia.

3. Bipolar disorder.

4. Seizure disorder.

5. Status post inferior vena cava filter for deep venous thrombosis and pulmonary

emboli. 

6. Rhabdomyolysis, improved.

7. Bed-bound status.



CONSULTATIONS:  None.



PERTINENT LABORATORY AND X-RAY FINDINGS:  

1. Basic metabolic profile within normal limits.  Magnesium 2.2.  Lactic acid level

1.9.  Total CK ranged between 510-1380.  CRP 1.14.  Lipase 28.  CBC showed a

hemoglobin ranging between 12.1 to 13.5.  PT 13.0, INR 1.0, PTT 25.6.  Blood

cultures x2 dated 11/29/2019, showed no growth at 48 hours. 

2. Three views of the left forearm dated 11/29/2019, showed no acute process.  

3. Three views of the left wrist dated 11/29/2019, showed no acute process.  

4. Venous Doppler study of the left upper extremity dated 11/29/2019, showed venous

thrombosis of the left basilic vein. 



HOSPITAL COURSE:  Patient was initially admitted to the medical floor after

presenting with left forearm redness and swelling.  The patient is a nursing home

resident in [place] presenting with left forearm erythema and edema.  The patient

underwent plain radiographs of the left upper extremity showing no acute bony

abnormality.  Vascular ultrasound did confirm a left basilic vein thrombus at which

point patient was initiated on Lovenox.  The patient was also noted with concomitant

rhabdomyolysis with initial total CK of 1380.  The patient was placed on IV fluids

as well as subcutaneous Lovenox and given supportive management throughout the

hospital course.  The patient did receive thermal therapy with a K-pad to the left

upper extremity throughout the hospital course.  Serial monitoring of total CK

showed overall improving values with IV fluid hydration.  The patient transitioned

from Lovenox to Eliquis and tolerated 5 mg b.i.d.  Overall, patient did remain

clinically stable through the remainder of the hospital course.  I have examined the

patient the time of discharge with followup instructions and disposition planning on

12/02/2019. 



DISCHARGE MEDICATIONS:  

1. Lipitor 20 mg p.o. at bedtime.

2. Bisacodyl 10 mg per rectum daily p.r.n.

3. Buspirone 20 mg p.o. b.i.d.

4. Vitamin D3 2000 units p.o. b.i.d.

5. Keppra 500 mg p.o. b.i.d.

6. Zestril 10 mg p.o. daily.

7. Mirtazapine 15 mg p.o. at bedtime.

8. Dilantin 50 mg p.o. t.i.d.

9. Eliquis 5 mg p.o. b.i.d.

10. Enteric-coated aspirin 81 mg p.o. daily.



FOLLOWUP:  The patient will follow up with Dr. Marin at [facility]

after discharge. 



CONDITION ON DISCHARGE:  Stable.



ACTIVITY:  Ad-katheryn.



DIET:  Heart healthy.



CODE STATUS:  Full.



DISPOSITION:  Discharged to [facility] 12/02/2019.



TIME SPENT WITH PATIENT:  Total time preparing and coordinating discharge 33 minutes.





Job ID:  [id number]

## 2020-03-03 ENCOUNTER — HOSPITAL ENCOUNTER (EMERGENCY)
Dept: HOSPITAL 92 - ERS | Age: 65
Discharge: HOME | End: 2020-03-03
Payer: COMMERCIAL

## 2020-03-03 DIAGNOSIS — Z79.82: ICD-10-CM

## 2020-03-03 DIAGNOSIS — Z79.899: ICD-10-CM

## 2020-03-03 DIAGNOSIS — Z86.718: ICD-10-CM

## 2020-03-03 DIAGNOSIS — E78.00: ICD-10-CM

## 2020-03-03 DIAGNOSIS — I10: ICD-10-CM

## 2020-03-03 DIAGNOSIS — F31.9: ICD-10-CM

## 2020-03-03 DIAGNOSIS — F03.90: ICD-10-CM

## 2020-03-03 DIAGNOSIS — Z86.711: ICD-10-CM

## 2020-03-03 DIAGNOSIS — E78.5: ICD-10-CM

## 2020-03-03 DIAGNOSIS — G40.909: ICD-10-CM

## 2020-03-03 DIAGNOSIS — Z79.01: ICD-10-CM

## 2020-03-03 DIAGNOSIS — R11.2: Primary | ICD-10-CM

## 2020-03-03 DIAGNOSIS — F41.9: ICD-10-CM

## 2020-03-03 LAB
ALBUMIN SERPL BCG-MCNC: 3.5 G/DL (ref 3.4–4.8)
ALP SERPL-CCNC: 150 U/L (ref 40–110)
ALT SERPL W P-5'-P-CCNC: 9 U/L (ref 8–55)
ANION GAP SERPL CALC-SCNC: 12 MMOL/L (ref 10–20)
AST SERPL-CCNC: 14 U/L (ref 5–34)
BASOPHILS # BLD AUTO: 0 THOU/UL (ref 0–0.2)
BASOPHILS NFR BLD AUTO: 0.3 % (ref 0–1)
BILIRUB SERPL-MCNC: 0.2 MG/DL (ref 0.2–1.2)
BUN SERPL-MCNC: 8 MG/DL (ref 9.8–20.1)
CALCIUM SERPL-MCNC: 8.6 MG/DL (ref 7.8–10.44)
CHLORIDE SERPL-SCNC: 103 MMOL/L (ref 98–107)
CO2 SERPL-SCNC: 27 MMOL/L (ref 23–31)
CREAT CL PREDICTED SERPL C-G-VRATE: 0 ML/MIN (ref 70–130)
EOSINOPHIL # BLD AUTO: 0 THOU/UL (ref 0–0.7)
EOSINOPHIL NFR BLD AUTO: 0.2 % (ref 0–10)
GLOBULIN SER CALC-MCNC: 3.2 G/DL (ref 2.4–3.5)
GLUCOSE SERPL-MCNC: 103 MG/DL (ref 80–115)
HGB BLD-MCNC: 13.9 G/DL (ref 12–16)
LYMPHOCYTES # BLD: 1.5 THOU/UL (ref 1.2–3.4)
LYMPHOCYTES NFR BLD AUTO: 21.8 % (ref 21–51)
MCH RBC QN AUTO: 34.9 PG (ref 27–31)
MCV RBC AUTO: 103 FL (ref 78–98)
MONOCYTES # BLD AUTO: 0.7 THOU/UL (ref 0.11–0.59)
MONOCYTES NFR BLD AUTO: 9.3 % (ref 0–10)
NEUTROPHILS # BLD AUTO: 4.8 THOU/UL (ref 1.4–6.5)
NEUTROPHILS NFR BLD AUTO: 68.4 % (ref 42–75)
PLATELET # BLD AUTO: 238 THOU/UL (ref 130–400)
POTASSIUM SERPL-SCNC: 3.9 MMOL/L (ref 3.5–5.1)
RBC # BLD AUTO: 3.98 MILL/UL (ref 4.2–5.4)
SODIUM SERPL-SCNC: 138 MMOL/L (ref 136–145)
WBC # BLD AUTO: 7 THOU/UL (ref 4.8–10.8)

## 2020-03-03 PROCEDURE — 80053 COMPREHEN METABOLIC PANEL: CPT

## 2020-03-03 PROCEDURE — 96360 HYDRATION IV INFUSION INIT: CPT

## 2020-03-03 PROCEDURE — 84484 ASSAY OF TROPONIN QUANT: CPT

## 2020-03-03 PROCEDURE — 70450 CT HEAD/BRAIN W/O DYE: CPT

## 2020-03-03 PROCEDURE — 81003 URINALYSIS AUTO W/O SCOPE: CPT

## 2020-03-03 PROCEDURE — 36415 COLL VENOUS BLD VENIPUNCTURE: CPT

## 2020-03-03 PROCEDURE — 96361 HYDRATE IV INFUSION ADD-ON: CPT

## 2020-03-03 PROCEDURE — 85025 COMPLETE CBC W/AUTO DIFF WBC: CPT

## 2020-03-03 PROCEDURE — 51701 INSERT BLADDER CATHETER: CPT

## 2020-03-03 PROCEDURE — 93005 ELECTROCARDIOGRAM TRACING: CPT

## 2020-03-03 NOTE — CT
CT BRAIN NONCONTRAST:



DATE:

3/3/2020



HISTORY:

64-year-old female with altered mental status, increasing lethargy, nausea and vomiting, and anorexia
.



COMPARISON:

11/18/2018



FINDINGS:

There is no evidence of acute intra-axial or extra-axial hemorrhage. There is no midline shift or any
 other mass effect. There is no extra-axial fluid collection. There is moderate dilation of lateral

ventricles bilaterally. Mild to moderate dilation of third ventricle. Normal fourth ventricle size. C
alvarium is intact. There is diffuse brain parenchymal volume loss. There are low attenuation areas

in the white matter. These are nonspecific, but in a patient of this age, they are probably chronic i
schemic white matter changes due to microvascular atherosclerosis. No interval change overall.



IMPRESSION:

1) No acute intracranial findings.

2) involutional changes and chronic ischemic white matter changes.

3) chronic ventriculomegaly, unchanged.



Reported By: Vamsi Davies 

Electronically Signed:  3/3/2020 12:25 PM